# Patient Record
(demographics unavailable — no encounter records)

---

## 2025-04-10 NOTE — PLAN
[FreeTextEntry1] : Patient to follow up in 1 year for annual GYN exam Mammogram and bilateral breast US due:  now rx given  Colonoscopy due:  now referred to [facility]  Bone density due: now for baseline/  PMB precautions discussed.    Pap orderedHemoccult ordered  All questions answered, patient agreeable with plan.

## 2025-04-10 NOTE — HISTORY OF PRESENT ILLNESS
[FreeTextEntry1] : Patient is a 54 yo female here today for new patient annual visit   hx 2 vaginal deliveries  last pap: unknown last mammo: unknown last bone density: never last colonsocopy:" never  patient has not had a period in approx 1 year. having some VMS and body aches and pains.   on medication for HTN